# Patient Record
Sex: FEMALE | Race: WHITE | NOT HISPANIC OR LATINO | URBAN - METROPOLITAN AREA
[De-identification: names, ages, dates, MRNs, and addresses within clinical notes are randomized per-mention and may not be internally consistent; named-entity substitution may affect disease eponyms.]

---

## 2021-12-29 ENCOUNTER — NEW PATIENT (OUTPATIENT)
Dept: URBAN - METROPOLITAN AREA CLINIC 26 | Facility: CLINIC | Age: 51
End: 2021-12-29

## 2021-12-29 VITALS
WEIGHT: 126 LBS | BODY MASS INDEX: 23.19 KG/M2 | SYSTOLIC BLOOD PRESSURE: 118 MMHG | HEIGHT: 62 IN | DIASTOLIC BLOOD PRESSURE: 78 MMHG | HEART RATE: 69 BPM

## 2021-12-29 DIAGNOSIS — H53.8: ICD-10-CM

## 2021-12-29 DIAGNOSIS — H35.372: ICD-10-CM

## 2021-12-29 DIAGNOSIS — H53.483: ICD-10-CM

## 2021-12-29 DIAGNOSIS — H25.13: ICD-10-CM

## 2021-12-29 DIAGNOSIS — H04.123: ICD-10-CM

## 2021-12-29 DIAGNOSIS — Z79.899: ICD-10-CM

## 2021-12-29 PROCEDURE — 92134 CPTRZ OPH DX IMG PST SGM RTA: CPT

## 2021-12-29 PROCEDURE — 92250 FUNDUS PHOTOGRAPHY W/I&R: CPT

## 2021-12-29 PROCEDURE — 92004 COMPRE OPH EXAM NEW PT 1/>: CPT

## 2021-12-29 ASSESSMENT — VISUAL ACUITY
OD_CC: 20/20
OS_SC: 20/30
OD_SC: 20/20
OS_PH: 20/20-1
OS_CC: 20/20-2

## 2021-12-29 ASSESSMENT — TONOMETRY
OD_IOP_MMHG: 14
OS_IOP_MMHG: 18

## 2021-12-29 NOTE — PATIENT DISCUSSION
12 29 21 WORSE WITH GLASSES - GETS WORSE AT NIGHT - SOME SPK SUGGESTING YANIQUE IS CAUSE - NO OTHER FACTORS SEEN.  C/O DECREASED PERIPHERAL VA - TO GET VF AT START AT TO SEE IF THEY HELP.